# Patient Record
Sex: FEMALE | Race: WHITE
[De-identification: names, ages, dates, MRNs, and addresses within clinical notes are randomized per-mention and may not be internally consistent; named-entity substitution may affect disease eponyms.]

---

## 2017-02-04 ENCOUNTER — HOSPITAL ENCOUNTER (OUTPATIENT)
Age: 77
Discharge: HOME | End: 2017-02-04
Payer: MEDICARE

## 2017-02-04 DIAGNOSIS — Z79.899: ICD-10-CM

## 2017-02-04 DIAGNOSIS — I10: ICD-10-CM

## 2017-02-04 DIAGNOSIS — E78.2: Primary | ICD-10-CM

## 2017-02-16 ENCOUNTER — HOSPITAL ENCOUNTER (OUTPATIENT)
Age: 77
Discharge: HOME | End: 2017-02-16
Payer: MEDICARE

## 2017-02-16 DIAGNOSIS — Z12.31: Primary | ICD-10-CM

## 2017-02-16 DIAGNOSIS — Z80.3: ICD-10-CM

## 2017-02-16 DIAGNOSIS — Z78.0: Primary | ICD-10-CM

## 2017-05-28 ENCOUNTER — HOSPITAL ENCOUNTER (OUTPATIENT)
Dept: HOSPITAL 76 - DI | Age: 77
Discharge: HOME | End: 2017-05-28
Attending: CHIROPRACTOR
Payer: MEDICARE

## 2017-05-28 DIAGNOSIS — M47.897: ICD-10-CM

## 2017-05-28 DIAGNOSIS — M51.36: ICD-10-CM

## 2017-05-28 DIAGNOSIS — M47.892: ICD-10-CM

## 2017-05-28 DIAGNOSIS — M47.896: ICD-10-CM

## 2017-05-28 DIAGNOSIS — M50.321: Primary | ICD-10-CM

## 2017-05-28 PROCEDURE — 72100 X-RAY EXAM L-S SPINE 2/3 VWS: CPT

## 2017-05-28 PROCEDURE — 72040 X-RAY EXAM NECK SPINE 2-3 VW: CPT
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
## 2018-02-26 ENCOUNTER — HOSPITAL ENCOUNTER (OUTPATIENT)
Dept: HOSPITAL 76 - LAB | Age: 78
Discharge: HOME | End: 2018-02-26
Attending: NURSE PRACTITIONER
Payer: MEDICARE

## 2018-02-26 DIAGNOSIS — I10: Primary | ICD-10-CM

## 2018-02-26 DIAGNOSIS — Z79.899: ICD-10-CM

## 2018-02-26 DIAGNOSIS — E78.5: ICD-10-CM

## 2018-02-26 LAB
ALBUMIN DIAFP-MCNC: 3.9 G/DL (ref 3.2–5.5)
ALBUMIN/GLOB SERPL: 1 {RATIO} (ref 1–2.2)
ALP SERPL-CCNC: 60 IU/L (ref 42–121)
ALT SERPL W P-5'-P-CCNC: 23 IU/L (ref 10–60)
ANION GAP SERPL CALCULATED.4IONS-SCNC: 10 MMOL/L (ref 6–13)
AST SERPL W P-5'-P-CCNC: 26 IU/L (ref 10–42)
BASOPHILS NFR BLD AUTO: 0 10^3/UL (ref 0–0.1)
BASOPHILS NFR BLD AUTO: 1 %
BILIRUB BLD-MCNC: 1 MG/DL (ref 0.2–1)
BUN SERPL-MCNC: 21 MG/DL (ref 6–20)
CALCIUM UR-MCNC: 9.6 MG/DL (ref 8.5–10.3)
CHLORIDE SERPL-SCNC: 100 MMOL/L (ref 101–111)
CHOLEST SERPL-MCNC: 198 MG/DL
CO2 SERPL-SCNC: 26 MMOL/L (ref 21–32)
CREAT SERPLBLD-SCNC: 1 MG/DL (ref 0.4–1)
EOSINOPHIL # BLD AUTO: 0.2 10^3/UL (ref 0–0.7)
EOSINOPHIL NFR BLD AUTO: 3.5 %
ERYTHROCYTE [DISTWIDTH] IN BLOOD BY AUTOMATED COUNT: 13.6 % (ref 12–15)
GFRSERPLBLD MDRD-ARVRAT: 54 ML/MIN/{1.73_M2} (ref 89–?)
GLOBULIN SER-MCNC: 3.9 G/DL (ref 2.1–4.2)
GLUCOSE SERPL-MCNC: 94 MG/DL (ref 70–100)
HDLC SERPL-MCNC: 83 MG/DL
HDLC SERPL: 2.4 {RATIO} (ref ?–4.4)
HGB UR QL STRIP: 12.8 G/DL (ref 12–16)
LDLC SERPL CALC-MCNC: 106 MG/DL
LDLC/HDLC SERPL: 1.3 {RATIO} (ref ?–4.4)
LYMPHOCYTES # SPEC AUTO: 1.3 10^3/UL (ref 1.5–3.5)
LYMPHOCYTES NFR BLD AUTO: 28.7 %
MCH RBC QN AUTO: 32.8 PG (ref 27–31)
MCHC RBC AUTO-ENTMCNC: 34.1 G/DL (ref 32–36)
MCV RBC AUTO: 96.3 FL (ref 81–99)
MONOCYTES # BLD AUTO: 0.5 10^3/UL (ref 0–1)
MONOCYTES NFR BLD AUTO: 11.4 %
NEUTROPHILS # BLD AUTO: 2.5 10^3/UL (ref 1.5–6.6)
NEUTROPHILS # SNV AUTO: 4.6 X10^3/UL (ref 4.8–10.8)
NEUTROPHILS NFR BLD AUTO: 55.4 %
PDW BLD AUTO: 7.6 FL (ref 7.9–10.8)
PLATELET # BLD: 326 10^3/UL (ref 130–450)
PROT SPEC-MCNC: 7.8 G/DL (ref 6.7–8.2)
RBC MAR: 3.9 10^6/UL (ref 4.2–5.4)
SODIUM SERPLBLD-SCNC: 136 MMOL/L (ref 135–145)
VLDLC SERPL-SCNC: 9 MG/DL

## 2018-02-26 PROCEDURE — 80053 COMPREHEN METABOLIC PANEL: CPT

## 2018-02-26 PROCEDURE — 80061 LIPID PANEL: CPT

## 2018-02-26 PROCEDURE — 36415 COLL VENOUS BLD VENIPUNCTURE: CPT

## 2018-02-26 PROCEDURE — 85025 COMPLETE CBC W/AUTO DIFF WBC: CPT

## 2018-02-26 PROCEDURE — 83721 ASSAY OF BLOOD LIPOPROTEIN: CPT

## 2018-02-26 PROCEDURE — 84443 ASSAY THYROID STIM HORMONE: CPT

## 2019-02-22 ENCOUNTER — HOSPITAL ENCOUNTER (OUTPATIENT)
Dept: HOSPITAL 76 - LAB.F | Age: 79
Discharge: HOME | End: 2019-02-22
Attending: NURSE PRACTITIONER
Payer: MEDICARE

## 2019-02-22 DIAGNOSIS — I10: Primary | ICD-10-CM

## 2019-02-22 DIAGNOSIS — Z79.899: ICD-10-CM

## 2019-02-22 DIAGNOSIS — E78.5: ICD-10-CM

## 2019-02-22 LAB
ALBUMIN DIAFP-MCNC: 3.9 G/DL (ref 3.2–5.5)
ALBUMIN/GLOB SERPL: 1.1 {RATIO} (ref 1–2.2)
ALP SERPL-CCNC: 65 IU/L (ref 42–121)
ALT SERPL W P-5'-P-CCNC: 23 IU/L (ref 10–60)
ANION GAP SERPL CALCULATED.4IONS-SCNC: 9 MMOL/L (ref 6–13)
AST SERPL W P-5'-P-CCNC: 32 IU/L (ref 10–42)
BASOPHILS NFR BLD AUTO: 0.1 10^3/UL (ref 0–0.1)
BASOPHILS NFR BLD AUTO: 3.2 %
BILIRUB BLD-MCNC: 1 MG/DL (ref 0.2–1)
BUN SERPL-MCNC: 23 MG/DL (ref 6–20)
CALCIUM UR-MCNC: 9.3 MG/DL (ref 8.5–10.3)
CHLORIDE SERPL-SCNC: 100 MMOL/L (ref 101–111)
CHOLEST SERPL-MCNC: 187 MG/DL
CO2 SERPL-SCNC: 29 MMOL/L (ref 21–32)
CREAT SERPLBLD-SCNC: 0.9 MG/DL (ref 0.4–1)
EOSINOPHIL # BLD AUTO: 0.1 10^3/UL (ref 0–0.7)
EOSINOPHIL NFR BLD AUTO: 2.6 %
ERYTHROCYTE [DISTWIDTH] IN BLOOD BY AUTOMATED COUNT: 13.6 % (ref 12–15)
GFRSERPLBLD MDRD-ARVRAT: 61 ML/MIN/{1.73_M2} (ref 89–?)
GLOBULIN SER-MCNC: 3.4 G/DL (ref 2.1–4.2)
GLUCOSE SERPL-MCNC: 94 MG/DL (ref 70–100)
HDLC SERPL-MCNC: 76 MG/DL
HDLC SERPL: 2.5 {RATIO} (ref ?–4.4)
HGB UR QL STRIP: 13 G/DL (ref 12–16)
LDLC SERPL CALC-MCNC: 101 MG/DL
LDLC/HDLC SERPL: 1.3 {RATIO} (ref ?–4.4)
LYMPHOCYTES # SPEC AUTO: 1 10^3/UL (ref 1.5–3.5)
LYMPHOCYTES NFR BLD AUTO: 23.8 %
MCH RBC QN AUTO: 32 PG (ref 27–31)
MCHC RBC AUTO-ENTMCNC: 32.5 G/DL (ref 32–36)
MCV RBC AUTO: 98.4 FL (ref 81–99)
MONOCYTES # BLD AUTO: 0.4 10^3/UL (ref 0–1)
MONOCYTES NFR BLD AUTO: 8.7 %
NEUTROPHILS # BLD AUTO: 2.6 10^3/UL (ref 1.5–6.6)
NEUTROPHILS # SNV AUTO: 4.2 X10^3/UL (ref 4.8–10.8)
NEUTROPHILS NFR BLD AUTO: 61.7 %
PDW BLD AUTO: 8.2 FL (ref 7.9–10.8)
PLATELET # BLD: 275 10^3/UL (ref 130–450)
PROT SPEC-MCNC: 7.3 G/DL (ref 6.7–8.2)
RBC MAR: 4.07 10^6/UL (ref 4.2–5.4)
SODIUM SERPLBLD-SCNC: 138 MMOL/L (ref 135–145)
VLDLC SERPL-SCNC: 10 MG/DL

## 2019-02-22 PROCEDURE — 80061 LIPID PANEL: CPT

## 2019-02-22 PROCEDURE — 83721 ASSAY OF BLOOD LIPOPROTEIN: CPT

## 2019-02-22 PROCEDURE — 80053 COMPREHEN METABOLIC PANEL: CPT

## 2019-02-22 PROCEDURE — 85025 COMPLETE CBC W/AUTO DIFF WBC: CPT

## 2019-02-22 PROCEDURE — 36415 COLL VENOUS BLD VENIPUNCTURE: CPT

## 2019-08-02 ENCOUNTER — HOSPITAL ENCOUNTER (OUTPATIENT)
Dept: HOSPITAL 76 - DI | Age: 79
Discharge: HOME | End: 2019-08-02
Attending: FAMILY MEDICINE
Payer: MEDICARE

## 2019-08-02 DIAGNOSIS — Z80.3: ICD-10-CM

## 2019-08-02 DIAGNOSIS — Z12.31: Primary | ICD-10-CM

## 2019-08-02 PROCEDURE — 77067 SCR MAMMO BI INCL CAD: CPT

## 2019-08-02 PROCEDURE — 77063 BREAST TOMOSYNTHESIS BI: CPT

## 2019-08-05 NOTE — MAMMOGRAPHY REPORT
Reason:  SCREENING FOR MALIGNANT NEOPLASM OF BREAST

Procedure Date:  08/02/2019   

Accession Number:  [id number] / [id number]                    

Procedure:  CATHIE - Screening Mammo w/Dusty CPT Code:  

 

FULL RESULT:

 

 

EXAM: Screening Mammo w/Dusty

 

DATE: 8/2/2019 5:02 PM

 

CLINICAL HISTORY:  Screening encounter. Family history of breast cancer 

in the mother at the age of 70.

 

TECHNIQUE: (B) - Bilateral  CC and MLO views were obtained.

 

COMPARISON: 2/16/2017 through 11/26/2013

 

PARENCHYMAL PATTERN: (A) - The breast(s) demonstrate(s) scattered 

fibroglandular densities.

 

FINDINGS:

A few typically benign appearing intramammary lymph nodes are again seen. 

There are no suspicious masses, calcifications, or areas of distortion.

 

IMPRESSION: Benign findings. BI-RADS category 2.

 

RECOMMENDATION: (ANNUAL)  - Recommend routine annual screening 

mammography.

 

BI-RADS CATEGORY: (2) - Benign Findings.

 

STANDARD QUALIFYING STATEMENTS:

1.  This examination was not reviewed with the aid of Computer-Aided 

Detection (CAD).

2.  A negative or benign  imaging report should not preclude biopsy if 

clinically suspicious findings are present.

3.  Dense breasts may obscure an underlying neoplasm.

4. This examination was reviewed with the aid of 3D breast imaging 

(tomosynthesis).

## 2019-08-14 ENCOUNTER — HOSPITAL ENCOUNTER (EMERGENCY)
Dept: HOSPITAL 76 - ED | Age: 79
Discharge: HOME | End: 2019-08-14
Payer: MEDICARE

## 2019-08-14 ENCOUNTER — HOSPITAL ENCOUNTER (OUTPATIENT)
Dept: HOSPITAL 76 - EMS | Age: 79
Discharge: ANOTHER HOSPITAL | End: 2019-08-14
Attending: SURGERY
Payer: MEDICARE

## 2019-08-14 VITALS — DIASTOLIC BLOOD PRESSURE: 117 MMHG | SYSTOLIC BLOOD PRESSURE: 166 MMHG
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
## 2019-08-14 DIAGNOSIS — S00.81XA: Primary | ICD-10-CM

## 2019-08-14 DIAGNOSIS — I10: ICD-10-CM
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
## 2019-08-14 DIAGNOSIS — S09.93XA: Primary | ICD-10-CM

## 2019-08-14 DIAGNOSIS — S01.511A: ICD-10-CM

## 2019-08-14 PROCEDURE — 90471 IMMUNIZATION ADMIN: CPT

## 2019-08-14 PROCEDURE — 99284 EMERGENCY DEPT VISIT MOD MDM: CPT

## 2019-08-14 PROCEDURE — 70450 CT HEAD/BRAIN W/O DYE: CPT

## 2019-08-14 PROCEDURE — 90715 TDAP VACCINE 7 YRS/> IM: CPT

## 2019-08-14 PROCEDURE — 70486 CT MAXILLOFACIAL W/O DYE: CPT

## 2019-08-14 PROCEDURE — 72125 CT NECK SPINE W/O DYE: CPT

## 2019-08-14 NOTE — ED PHYSICIAN DOCUMENTATION
History of Present Illness





- Stated complaint


Stated Complaint: GLF





- Chief complaint


Chief Complaint: Trauma Hd/Nk





- History obtained from


History obtained from: Patient, EMS





- History of Present Illness


Timing: Today


Pain level max: 7


Pain level now: 5





- Additonal information


Additional information: 





79-year-old female was walking her dog today when she tripped fell, landing on 

the left side of her face.  No loss of consciousness.  No vomiting.  Placed in a

cervical collar by EMS.  Unknown last tetanus.  Worse with movement and 

palpation.  No numbness or tingling.





Review of Systems


Constitutional: denies: Fever, Chills


Respiratory: denies: Cough


GI: denies: Nausea, Vomiting


Skin: denies: Rash


Musculoskeletal: denies: Back pain


Neurologic: denies: Focal weakness, Numbness





PD PAST MEDICAL HISTORY





- Past Medical History


Cardiovascular: Hypertension


Respiratory: None


Endocrine/Autoimmune: None


GI: GERD


: None


HEENT: None


Psych: None


Musculoskeletal: Osteoarthritis


Derm: None





- Past Surgical History


Past Surgical History: Yes


General: Colonoscopy


Ortho: Knee replacement, Arthroscopic surgery


/GYN: Hysterectomy


HEENT: Cataracts





- Present Medications


Home Medications: 


                                Ambulatory Orders











 Medication  Instructions  Recorded  Confirmed


 


Amlodipine Besylate [Norvasc] 2.5 tab PO DAILY 12/03/14 04/06/16


 


Triamterene/Hydrochlorothiazid 0.5 tab PO DAILY 04/05/16 04/06/16





[Triamterene-Hctz 37.5-25 mg Cp]   


 


Glucosam/Chondr-MSM#6/Manganes 1 tab PO 04/06/16 





[Glucosamine-Chondroitin Sftgl]   


 


Cephalexin [Keflex] 500 mg PO Q6H #40 capsule 08/14/19 














- Allergies


Allergies/Adverse Reactions: 


                                    Allergies











Allergy/AdvReac Type Severity Reaction Status Date / Time


 


meperidine HCl * Allergy  Nausea Verified 08/14/19 10:13





[From Demerol]     


 


Sulfa (Sulfonamide Allergy  Unknown Verified 08/14/19 10:13





Antibiotics)     


 


Narcot AdvReac Mild Nausea Uncoded 08/14/19 10:13














- Social History


Does the pt smoke?: No


Smoking Status: Never smoker


Does the pt drink ETOH?: Yes





PD ED PE NORMAL





- Vitals


Vital signs reviewed: Yes





- General


General: Alert and oriented X 3, No acute distress, Well developed/nourished





- HEENT


HEENT: PERRL, EOMI, Ears normal, Moist mucous membranes, Pharynx benign, 

Dentition benign





- Neck


Neck: Supple, no meningeal sign, No bony TTP





- Cardiac


Cardiac: RRR, Strong equal pulses





- Respiratory


Respiratory: No respiratory distress, Clear bilaterally





- Abdomen


Abdomen: Soft, Non tender, Non distended





- Back


Back: No spinal TTP





- Derm


Derm: Warm and dry





- Extremities


Extremities: No tenderness to palpate, Normal ROM s pain





- Neuro


Neuro: Alert and oriented X 3, CNs 2-12 intact, No motor deficit, No sensory 

deficit





- Psych


Psych: Normal mood, Normal affect





- Free text exam


Free text exam: 





diffuse L sided facial abrasions and upper lip abrasions.  ecchymosis to L 

orbit.





Results





- Vitals


Vitals: 


                               Vital Signs - 24 hr











  08/14/19 08/14/19 08/14/19





  10:10 12:13 13:36


 


Temperature 35.7 C L  


 


Heart Rate 61 56 L 113 H


 


Respiratory 14 18 16





Rate   


 


Blood Pressure 178/89 H 169/84 H 166/117 H


 


O2 Saturation 96 98 98








                                     Oxygen











O2 Source                      Room air

















- Rads (name of study)


  ** head Ct


Radiology: Prelim report reviewed, EMP read contemporaneously, See rad report 

(1. No intracranial hemorrhage, mass-effect, CT evidence of acute infarct, or 

other acute intracranial abnormality. 2. Focal soft tissue swelling/subcutaneous

hematoma in the left frontal region measuring approximately 6.0 x 1.3 cm. 3. 

Please see separately reported maxillofacial CT and cervical spine CT for 

additional evaluation. )





  ** maxillofacial CT


Radiology: Prelim report reviewed, EMP read contemporaneously, See rad report 

(No acute fracture. Soft tissue edema/hematoma in the left periorbital region 

and adjacent anterior scalp. )





  ** cervical spine CT


Radiology: Prelim report reviewed, EMP read contemporaneously, See rad report ( 

No fracture or other acute osseous abnormality of the cervical spine. 2. Mild to

moderate degenerative disk changes of the cervical spine, most advanced at the 

C6-C7 level. There is mild central canal narrowing at the C6-C7 level. There are

moderate to severe bilateral degenerative facet changes. )





PD MEDICAL DECISION MAKING





- ED course


Complexity details: reviewed results, considered differential, d/w patient


ED course: 





No acute findings on CT scans.  Diffuse facial abrasions.  There is one small 

laceration above the lip.  Does not involve the vermilion border.  Gravel was 

cleansed from the face.  Wounds were irrigated.  A small amount of Dermabond was

applied where it could be.  She will likely have extensive scarring and this was

shared with the patient.  Will place on Keflex due to the large surface 

abrasions.  No evidence of fracture.  Warnings of infection and instructions on 

wound care given at bedside. Also counseled on how to minimize scarring.  

Patient counseled regarding signs and symptoms for which I believe and urgent 

re-evaluation would be necessary. Patient with good understanding of and 

agreement to plan and is comfortable going home at this time





This document was made in part using voice recognition software. While efforts 

are made to proofread this document, sound alike and grammatical errors may 

occur.





Departure





- Departure


Disposition: 01 Home, Self Care


Clinical Impression: 


Facial abrasion


Qualifiers:


 Encounter type: initial encounter Qualified Code(s): S00.81XA - Abrasion of 

other part of head, initial encounter





Facial laceration


Qualifiers:


 Encounter type: initial encounter Qualified Code(s): S01.81XA - Laceration 

without foreign body of other part of head, initial encounter





Condition: Good


Instructions:  ED Abrasion, ED Laceration Facial Skin Glue


Follow-Up: 


Sina North MD [Primary Care Provider] - Within 1 week


Prescriptions: 


Cephalexin [Keflex] 500 mg PO Q6H #40 capsule


Comments: 


Return if you worsen. Keep the wounds clean. Take all antibiotics until gone. 

The glue will dissolve in a few days. 


Discharge Date/Time: 08/14/19 13:37

## 2019-08-14 NOTE — CT REPORT
Reason:  fall, head pain

Procedure Date:  08/14/2019   

Accession Number:  [id number] / [id number]                    

Procedure:  CT  - HEAD WO CPT Code:  

 

FULL RESULT:

 

 

EXAM:

CT HEAD

 

EXAM DATE: 8/14/2019 11:44 AM.

 

CLINICAL HISTORY: Fall, head pain.

 

COMPARISON: CT FACIAL BONES W/O 08/14/2019 11:30 AM

CT CERVICAL SPINE W/O 08/14/2019 11:30 AM.

 

TECHNIQUE: Multiaxial CT images were obtained from the foramen magnum to 

the vertex. Reformats: Sagittal and coronal. IV contrast: None.

 

In accordance with CT protocol optimization, one or more of the following 

dose reduction techniques were utilized for this exam: automated exposure 

control, adjustment of mA and/or KV based on patient size, or use of 

iterative reconstructive technique.

 

FINDINGS:

Parenchyma: No intraparenchymal hemorrhage. No evidence of mass, midline 

shift, or CT findings of infarction. Gray-white differentiation is 

distinct.

 

Extraaxial Spaces: Normal for age. No subdural or epidural collections 

identified.

 

Ventricles: Normal in size and position.

 

Sinuses and Orbits: Imaged paranasal sinuses, orbits, and mastoids show 

no significant abnormality.

 

Bones: No evidence of fracture or calvarial defect. Please use separately 

reported maxillofacial CT for additional evaluation.

 

Other: There is a subcutaneous hematoma/area of soft tissue swelling 

overlying the left frontal region. This measures approximately 6.0 x 1.3 

cm (series 2 image 18).

IMPRESSION:

1. No intracranial hemorrhage, mass-effect, CT evidence of acute infarct, 

or other acute intracranial abnormality.

2. Focal soft tissue swelling/subcutaneous hematoma in the left frontal 

region measuring approximately 6.0 x 1.3 cm.

3. Please see separately reported maxillofacial CT and cervical spine CT 

for additional evaluation.

 

[facility]

## 2019-08-14 NOTE — CT REPORT
Reason:  fall, L facial pain

Procedure Date:  08/14/2019   

Accession Number:  [id number] / [id number]                    

Procedure:  CT  - MAXILLOFACIAL WO CPT Code:  

 

FULL RESULT:

 

 

EXAM:

CT MAXILLOFACIAL WITHOUT CONTRAST

 

EXAM DATE: 8/14/2019 11:44 AM.

 

CLINICAL HISTORY: Pain post injury after a fall.

 

COMPARISONS: None.

 

TECHNIQUE: Thin-section axial images were acquired of the face without 

contrast. Post-processing: Coronal and sagittal reformats. Other: None.

 

In accordance with CT protocol optimization, one or more of the following 

dose reduction techniques were utilized for this exam: automated exposure 

control, adjustment of mA and/or KV based on patient size, or use of 

iterative reconstructive technique.

 

FINDINGS:

Left anterior scalp/forehead swelling and edema with probable 

subcutaneous hematoma extending inferiorly into the left preseptal 

periorbital region.

 

No underlying skull fracture.

 

No acute facial bone fracture. No fracture or dislocation of the mandible.

 

Multiple periapical lucencies consistent with dental decay, most evident 

in the left maxilla and right mandible..

 

No acute sinus or mastoid fluid opacity.

 

Prominent chronic degenerative changes in the upper cervical spine.

IMPRESSION: No acute fracture. Soft tissue edema/hematoma in the left 

periorbital region and adjacent anterior scalp.

 

[facility]

## 2019-08-14 NOTE — CT REPORT
Reason:  fall, neck pain

Procedure Date:  08/14/2019   

Accession Number:  [id number] / [id number]                    

Procedure:  CT  - CERVICAL SPINE WO CPT Code:  

 

FULL RESULT:

 

 

EXAM:

CT CERVICAL SPINE WITHOUT CONTRAST

 

DATE: 8/14/2019 11:44 AM.

 

HISTORY: Fall, neck pain.

 

COMPARISONS: HEAD W/O 08/14/2019 11:30 AM.

 

TECHNIQUE: Thin-section axial images were acquired of the cervical spine 

without contrast. Post-processing: Coronal and sagittal reformats. Other: 

None.

 

In accordance with CT protocol optimization, one or more of the following 

dose reduction techniques were utilized for this exam: automated exposure 

control, adjustment of mA and/or KV based on patient size, or use of 

iterative reconstructive technique.

 

FINDINGS:

Alignment: No scoliosis or spondylolisthesis.

 

Bones: No fracture or bone lesion.

 

Interspace Levels/Facets:

There are mild degenerative changes at the atlantodental joint. There are 

mild to moderate degenerative disk changes throughout the cervical spine, 

most advanced at the C6-C7 level. There are moderate to severe 

degenerative facet changes throughout the cervical spine. There is fusion 

at the left C3-C4 facet. There is mild central canal narrowing at the 

C6-C7 level.

 

Musculature: Normal. No fatty atrophy.

 

Other: The paravertebral and prevertebral soft tissues are unremarkable. 

The lung apices are clear.

IMPRESSION:

1. No fracture or other acute osseous abnormality of the cervical spine.

2. Mild to moderate degenerative disk changes of the cervical spine, most 

advanced at the C6-C7 level. There is mild central canal narrowing at the 

C6-C7 level. There are moderate to severe bilateral degenerative facet 

changes.

 

[facility]

## 2021-01-14 DIAGNOSIS — Z23 NEED FOR VACCINATION: ICD-10-CM

## 2021-05-13 ENCOUNTER — HOSPITAL ENCOUNTER (EMERGENCY)
Dept: HOSPITAL 76 - ED | Age: 81
Discharge: HOME | End: 2021-05-13
Payer: MEDICARE

## 2021-05-13 VITALS — SYSTOLIC BLOOD PRESSURE: 155 MMHG | DIASTOLIC BLOOD PRESSURE: 63 MMHG

## 2021-05-13 DIAGNOSIS — S81.811A: Primary | ICD-10-CM
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
## 2021-05-13 DIAGNOSIS — I10: ICD-10-CM

## 2021-05-13 DIAGNOSIS — S86.821A: ICD-10-CM
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
## 2021-05-13 PROCEDURE — 99282 EMERGENCY DEPT VISIT SF MDM: CPT

## 2021-05-13 PROCEDURE — 12034 INTMD RPR S/TR/EXT 7.6-12.5: CPT

## 2021-05-13 PROCEDURE — 99283 EMERGENCY DEPT VISIT LOW MDM: CPT
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
## 2021-07-06 ENCOUNTER — HOSPITAL ENCOUNTER (OUTPATIENT)
Dept: HOSPITAL 76 - LAB.WCP | Age: 81
Discharge: HOME | End: 2021-07-06
Attending: FAMILY MEDICINE
Payer: MEDICARE

## 2021-07-06 DIAGNOSIS — S81.801A: Primary | ICD-10-CM

## 2021-07-06 PROCEDURE — 87077 CULTURE AEROBIC IDENTIFY: CPT

## 2021-07-06 PROCEDURE — 87070 CULTURE OTHR SPECIMN AEROBIC: CPT

## 2021-07-06 PROCEDURE — 87205 SMEAR GRAM STAIN: CPT

## 2021-07-06 PROCEDURE — 87181 SC STD AGAR DILUTION PER AGT: CPT

## 2021-07-15 ENCOUNTER — HOSPITAL ENCOUNTER (OUTPATIENT)
Dept: HOSPITAL 76 - DI | Age: 81
Discharge: HOME | End: 2021-07-15
Attending: FAMILY MEDICINE
Payer: MEDICARE

## 2021-07-15 DIAGNOSIS — S81.801D: Primary | ICD-10-CM

## 2021-07-15 NOTE — XRAY REPORT
PROCEDURE:  Tib/Fib RT

 

INDICATIONS:  UNSP OPEN WOUND RT L LEG

 

TECHNIQUE:  2 views of the tibia and fibula were acquired.  

 

COMPARISON:  X-ray ankle the 2013

 

FINDINGS:  

 

Bones:  No fractures or dislocations.  No suspicious bony lesions.  Knee arthroplasty is present and 
appears unremarkable.

 

Soft tissues:  No suspicious soft tissue calcifications or masses.  

 

IMPRESSION:  

Osseous structures are unremarkable.

 

Reviewed by: Gala Rolon MD on 7/15/2021 5:11 PM PDT

Approved by: Gala Rolon MD on 7/15/2021 5:11 PM PDT

 

 

Station ID:  [id number]

## 2021-09-13 ENCOUNTER — HOSPITAL ENCOUNTER (OUTPATIENT)
Dept: HOSPITAL 76 - LAB | Age: 81
Discharge: HOME | End: 2021-09-13
Attending: NURSE PRACTITIONER
Payer: MEDICARE

## 2021-09-13 ENCOUNTER — HOSPITAL ENCOUNTER (OUTPATIENT)
Dept: HOSPITAL 76 - DI | Age: 81
Discharge: HOME | End: 2021-09-13
Attending: NURSE PRACTITIONER
Payer: MEDICARE

## 2021-09-13 DIAGNOSIS — R42: ICD-10-CM

## 2021-09-13 DIAGNOSIS — Z13.220: ICD-10-CM

## 2021-09-13 DIAGNOSIS — R09.89: Primary | ICD-10-CM

## 2021-09-13 DIAGNOSIS — R42: Primary | ICD-10-CM

## 2021-09-13 LAB
ALBUMIN DIAFP-MCNC: 4.1 G/DL (ref 3.2–5.5)
ALBUMIN/GLOB SERPL: 1.2 {RATIO} (ref 1–2.2)
ALP SERPL-CCNC: 72 IU/L (ref 42–121)
ALT SERPL W P-5'-P-CCNC: 19 IU/L (ref 10–60)
ANION GAP SERPL CALCULATED.4IONS-SCNC: 10 MMOL/L (ref 6–13)
AST SERPL W P-5'-P-CCNC: 26 IU/L (ref 10–42)
BASOPHILS NFR BLD AUTO: 0.1 10^3/UL (ref 0–0.1)
BASOPHILS NFR BLD AUTO: 1.1 %
BILIRUB BLD-MCNC: 0.7 MG/DL (ref 0.2–1)
BUN SERPL-MCNC: 24 MG/DL (ref 6–20)
CALCIUM UR-MCNC: 10.1 MG/DL (ref 8.5–10.3)
CHLORIDE SERPL-SCNC: 101 MMOL/L (ref 101–111)
CHOLEST SERPL-MCNC: 205 MG/DL
CLARITY UR REFRACT.AUTO: CLEAR
CO2 SERPL-SCNC: 28 MMOL/L (ref 21–32)
CREAT SERPLBLD-SCNC: 1.1 MG/DL (ref 0.4–1)
EOSINOPHIL # BLD AUTO: 0.3 10^3/UL (ref 0–0.7)
EOSINOPHIL NFR BLD AUTO: 3.9 %
ERYTHROCYTE [DISTWIDTH] IN BLOOD BY AUTOMATED COUNT: 12.9 % (ref 12–15)
GFRSERPLBLD MDRD-ARVRAT: 48 ML/MIN/{1.73_M2} (ref 89–?)
GLOBULIN SER-MCNC: 3.5 G/DL (ref 2.1–4.2)
GLUCOSE SERPL-MCNC: 98 MG/DL (ref 70–100)
GLUCOSE UR QL STRIP.AUTO: NEGATIVE MG/DL
HCT VFR BLD AUTO: 41.9 % (ref 37–47)
HDLC SERPL-MCNC: 75 MG/DL
HDLC SERPL: 2.7 {RATIO} (ref ?–4.4)
HGB UR QL STRIP: 13.8 G/DL (ref 12–16)
KETONES UR QL STRIP.AUTO: (no result) MG/DL
LDLC SERPL CALC-MCNC: 89 MG/DL
LDLC/HDLC SERPL: 1.2 {RATIO} (ref ?–4.4)
LYMPHOCYTES # SPEC AUTO: 2.2 10^3/UL (ref 1.5–3.5)
LYMPHOCYTES NFR BLD AUTO: 34.5 %
MCH RBC QN AUTO: 31.9 PG (ref 27–31)
MCHC RBC AUTO-ENTMCNC: 32.9 G/DL (ref 32–36)
MCV RBC AUTO: 96.8 FL (ref 81–99)
MONOCYTES # BLD AUTO: 0.6 10^3/UL (ref 0–1)
MONOCYTES NFR BLD AUTO: 9.8 %
NEUTROPHILS # BLD AUTO: 3.2 10^3/UL (ref 1.5–6.6)
NEUTROPHILS # SNV AUTO: 6.3 X10^3/UL (ref 4.8–10.8)
NEUTROPHILS NFR BLD AUTO: 50.4 %
NITRITE UR QL STRIP.AUTO: NEGATIVE
NRBC # BLD AUTO: 0 /100WBC
NRBC # BLD AUTO: 0 X10^3/UL
PDW BLD AUTO: 9.1 FL (ref 7.9–10.8)
PH UR STRIP.AUTO: 6 PH (ref 5–7.5)
PLATELET # BLD: 305 10^3/UL (ref 130–450)
POTASSIUM SERPL-SCNC: 3.8 MMOL/L (ref 3.5–5)
PROT SPEC-MCNC: 7.6 G/DL (ref 6.7–8.2)
PROT UR STRIP.AUTO-MCNC: NEGATIVE MG/DL
RBC # UR STRIP.AUTO: NEGATIVE /UL
RBC MAR: 4.33 10^6/UL (ref 4.2–5.4)
SODIUM SERPLBLD-SCNC: 139 MMOL/L (ref 135–145)
SP GR UR STRIP.AUTO: 1.02 (ref 1–1.03)
SQUAMOUS URNS QL MICRO: (no result)
TRIGL P FAST SERPL-MCNC: 203 MG/DL
UROBILINOGEN UR QL STRIP.AUTO: (no result) E.U./DL
UROBILINOGEN UR STRIP.AUTO-MCNC: NEGATIVE MG/DL
VLDLC SERPL-SCNC: 41 MG/DL
WBC # UR MANUAL: (no result) /HPF (ref 0–5)

## 2021-09-13 PROCEDURE — 80053 COMPREHEN METABOLIC PANEL: CPT

## 2021-09-13 PROCEDURE — 81001 URINALYSIS AUTO W/SCOPE: CPT

## 2021-09-13 PROCEDURE — 85025 COMPLETE CBC W/AUTO DIFF WBC: CPT

## 2021-09-13 PROCEDURE — 36415 COLL VENOUS BLD VENIPUNCTURE: CPT

## 2021-09-13 PROCEDURE — 84443 ASSAY THYROID STIM HORMONE: CPT

## 2021-09-13 PROCEDURE — 83721 ASSAY OF BLOOD LIPOPROTEIN: CPT

## 2021-09-13 PROCEDURE — 80061 LIPID PANEL: CPT

## 2021-09-13 PROCEDURE — 87086 URINE CULTURE/COLONY COUNT: CPT

## 2021-09-13 PROCEDURE — 93880 EXTRACRANIAL BILAT STUDY: CPT

## 2021-09-13 NOTE — ULTRASOUND REPORT
PROCEDURE:  Carotid Doppler Complete

 

INDICATIONS:  CAROTID BRUIT

 

TECHNIQUE:  

Color and pulse Doppler interrogation was performed of both carotid systems, with image documentation
 and velocity measurements.  

 

COMPARISON:  None.

 

FINDINGS:     

 

Right side:  

Brachial blood pressure:  160/70 mm Hg.  

Common carotid artery peak systolic velocity:  47 cm/sec.  

Internal carotid artery peak systolic velocity:  54 cm/sec.  

Internal carotid artery end diastolic velocity:  14 cm/sec.  

External carotid artery peak systolic velocity:  70 cm/sec.  

ICA/CCA peak systolic ratio:  1.2 .  

Gray scale imaging description:  Mild plaque at the bifurcation. 

Percent internal carotid artery stenosis:  Less than 50% .  

Vertebral artery:  Flow direction is antegrade.  

 

Left side:  

Brachial blood pressure:  164/84 mm Hg.

Common carotid artery peak systolic velocity:  67 cm/sec.  

Internal carotid artery peak systolic velocity:  79 cm/sec.  

Internal carotid artery end diastolic velocity:  22 cm/sec.  

External carotid artery peak systolic velocity:  55 cm/sec.  

ICA/CCA peak systolic ratio:  1.2 .  

Gray scale imaging description:  Mild plaque at the bifurcation 

Percent internal carotid artery stenosis:  Less than 50% .

Vertebral artery:  Flow direction is antegrade.  

 

IMPRESSION:  

Less than 50% stenosis of the internal carotid arteries bilaterally.

 

The estimate of stenosis included in the report of the imaging study was calculated using the NASCET 
method

 

Reviewed by: Gala Rolon MD on 9/13/2021 4:34 PM PDT

Approved by: Gala Rolon MD on 9/13/2021 4:34 PM PDT

 

 

Station ID:  [id number]

## 2021-10-15 ENCOUNTER — HOSPITAL ENCOUNTER (OUTPATIENT)
Dept: HOSPITAL 76 - LAB | Age: 81
Discharge: HOME | End: 2021-10-15
Attending: NURSE PRACTITIONER
Payer: MEDICARE

## 2021-10-15 DIAGNOSIS — R10.32: ICD-10-CM

## 2021-10-15 DIAGNOSIS — K57.32: Primary | ICD-10-CM

## 2021-10-15 DIAGNOSIS — R93.3: ICD-10-CM

## 2021-10-15 LAB
ALBUMIN DIAFP-MCNC: 4.3 G/DL (ref 3.2–5.5)
ALBUMIN/GLOB SERPL: 1.1 {RATIO} (ref 1–2.2)
ALP SERPL-CCNC: 64 IU/L (ref 42–121)
ALT SERPL W P-5'-P-CCNC: 19 IU/L (ref 10–60)
ANION GAP SERPL CALCULATED.4IONS-SCNC: 10 MMOL/L (ref 6–13)
AST SERPL W P-5'-P-CCNC: 23 IU/L (ref 10–42)
BILIRUB BLD-MCNC: 0.7 MG/DL (ref 0.2–1)
BUN SERPL-MCNC: 25 MG/DL (ref 6–20)
CALCIUM UR-MCNC: 10.4 MG/DL (ref 8.5–10.3)
CHLORIDE SERPL-SCNC: 99 MMOL/L (ref 101–111)
CO2 SERPL-SCNC: 28 MMOL/L (ref 21–32)
CREAT SERPLBLD-SCNC: 0.9 MG/DL (ref 0.4–1)
GFRSERPLBLD MDRD-ARVRAT: 60 ML/MIN/{1.73_M2} (ref 89–?)
GLOBULIN SER-MCNC: 3.8 G/DL (ref 2.1–4.2)
GLUCOSE SERPL-MCNC: 98 MG/DL (ref 70–100)
POTASSIUM SERPL-SCNC: 4.3 MMOL/L (ref 3.5–5)
PROT SPEC-MCNC: 8.1 G/DL (ref 6.7–8.2)
SODIUM SERPLBLD-SCNC: 137 MMOL/L (ref 135–145)

## 2021-10-15 PROCEDURE — 74177 CT ABD & PELVIS W/CONTRAST: CPT

## 2021-10-15 PROCEDURE — 36415 COLL VENOUS BLD VENIPUNCTURE: CPT

## 2021-10-15 PROCEDURE — 80053 COMPREHEN METABOLIC PANEL: CPT

## 2021-10-15 NOTE — CT REPORT
PROCEDURE:  Abdomen/Pelvis W

 

INDICATIONS:  LEFT LOWER QUAD PAIN

 

CONTRAST:  IV CONTRAST: Optiray 320 ml: 100 PO CONTRAST: Optiray 320 ml50

 

TECHNIQUE:  

After the administration of oral and intravenous contrast, 5 mm thick sections acquired from the diap
hragms to the symphysis.  5 mm thick coronal and sagittal reformats were acquired.  For radiation dos
e reduction, the following was used:  automated exposure control, adjustment of mA and/or kV accordin
g to patient size.  

 

COMPARISON:  None.

 

FINDINGS:  

Image quality:  Excellent.  

 

ABDOMEN:  

Lung bases:  Lung bases are clear.  Heart size is normal.  

 

Solid organs: There is a cyst within the right hepatic lobe. Gallbladder appears within normal limits
 without calcified gallstones. Biliary system is non dilated.  The spleen is normal in size. Pancreas
 enhances normally without peripancreatic fat stranding or fluid collections.  No adrenal nodules.  K
idneys demonstrate no hydronephrosis. 

 

Peritoneum and bowel: Stomach and small bowel loops demonstrate normal wall thickness and caliber. No
 evidence of appendicitis. There is colonic diverticulosis with associated diverticular wall thickeni
ng and peridiverticular inflammatory fat stranding in the sigmoid colon consistent with acute diverti
culitis. There is associated segmental wall thickening in the sigmoid colon consistent with a colitis
. No diverticular abscess or macroscopic free air. No free fluid or air.  

 

Nodes and vessels:  No retroperitoneal or mesenteric adenopathy by size criteria.  Aorta and inferior
 vena cava are normal in size.  

 

Miscellaneous:  No ventral hernias.  

 

 

PELVIS:  

Genitourinary:  Bladder wall thickness is normal. The uterus is surgically absent. 

 

Miscellaneous:  No inguinal hernias or adenopathy.  

 

Bones:  No suspicious bony lesions.  No vertebral body compression fractures.  

 

IMPRESSION:  

 

1. Acute diverticulitis in the sigmoid colon without evidence of diverticular abscess or macroscopic 
free air.

 

2. Segmental wall thickening of the sigmoid colon likely reflects an associated colitis.

 

Findings reported to Yael Waldron PA-C on 10/15/2021 at 2:05 PM.

 

Reviewed by: Yusef Moran MD on 10/15/2021 2:11 PM PDT

Approved by: Yusef Moran MD on 10/15/2021 2:11 PM PDT

 

 

Station ID:  [id number]

## 2021-10-29 ENCOUNTER — HOSPITAL ENCOUNTER (OUTPATIENT)
Dept: HOSPITAL 76 - DI | Age: 81
Discharge: HOME | End: 2021-10-29
Attending: NURSE PRACTITIONER
Payer: MEDICARE

## 2021-10-29 DIAGNOSIS — K76.89: Primary | ICD-10-CM

## 2021-10-29 NOTE — ULTRASOUND REPORT
PROCEDURE: Abdomen Limited

 

INDICATIONS:  LIVER CYST

 

TECHNIQUE:  

Real-time focused scanning was performed of the abdomen, with image documentation.  

 

COMPARISON:  CT abdomen pelvis 10/15/2021

 

FINDINGS:  

 

Liver measures 14.6 cm. It is mildly increased in echogenicity. There is a focus of hypoechogenicity 
within the posterior liver measuring 3.0 x 2.8 x 3.0 cm. Gallbladder is distended without stones. Wal
l thickness is within normal limits measuring 2 mm. Common bile duct measures 2 mm. Visualized portio
ns of the pancreas and inferior vena cava are unremarkable.

 

IMPRESSION:  

Hepatic cyst.

 

Reviewed by: Gala Rolon MD on 10/29/2021 4:16 PM PDT

Approved by: Gala Rolon MD on 10/29/2021 4:16 PM PDT

 

 

Station ID:  [id number]

## 2022-01-25 ENCOUNTER — HOSPITAL ENCOUNTER (OUTPATIENT)
Dept: HOSPITAL 76 - LAB | Age: 82
Discharge: HOME | End: 2022-01-25
Attending: PHYSICIAN ASSISTANT
Payer: MEDICARE

## 2022-01-25 DIAGNOSIS — K76.89: ICD-10-CM

## 2022-01-25 DIAGNOSIS — I70.209: ICD-10-CM

## 2022-01-25 DIAGNOSIS — K57.32: Primary | ICD-10-CM

## 2022-01-25 LAB
CREAT SERPLBLD-SCNC: 0.9 MG/DL (ref 0.4–1)
GFRSERPLBLD MDRD-ARVRAT: 60 ML/MIN/{1.73_M2} (ref 89–?)

## 2022-01-25 PROCEDURE — 36415 COLL VENOUS BLD VENIPUNCTURE: CPT

## 2022-01-25 PROCEDURE — 74177 CT ABD & PELVIS W/CONTRAST: CPT

## 2022-01-25 PROCEDURE — 82565 ASSAY OF CREATININE: CPT

## 2022-01-25 NOTE — CT REPORT
PROCEDURE:  Abdomen/Pelvis W

 

INDICATIONS:  DIVERTICULITIS OF COLON

 

CONTRAST:  IV CONTRAST: Isovue 300 ml: 100 PO CONTRAST: Isovue 300 ml50

 

TECHNIQUE:  

After the administration of weight appropriate dose of intravenous contrast, 5 mm thick sections acqu
ired from the diaphragms to the symphysis.  5 mm thick coronal and sagittal reformats were acquired. 
 For radiation dose reduction, the following was used:  automated exposure control, adjustment of mA 
and/or kV according to patient size. Oral contrast was also administered.

 

COMPARISON:  10/15/2021

 

FINDINGS:  

Image quality:  Excellent.  

 

ABDOMEN:  

Lung bases: Minimal bibasilar atelectasis.  Heart size is normal.  

 

Solid organs:  Liver and spleen are normal in size and enhancement.  Stable hepatic hypodensities com
patible with cysts. Gallbladder is unremarkable.  Biliary system is non dilated.  Pancreas enhances n
ormally.  No adrenal nodules.  Kidneys demonstrate normal size and enhancement, without hydronephrosi
s.  

 

Peritoneum and bowel: Extensive colonic diverticula most pronounced in the sigmoid colon. Previously 
described acute sigmoid diverticulitis has resolved. Previously seen mild segmental wall thickening o
f the sigmoid colon has also decreased in conspicuity. No evidence for bowel obstruction. No other ar
eas to suggest acute inflammatory changes. No suspicious areas of abnormal wall thickening. No free f
luid or air.  

 

Nodes and vessels:  No retroperitoneal or mesenteric adenopathy by size criteria.  Aorta and inferior
 vena cava are normal in size.  Atherosclerotic calcifications are present.

 

Miscellaneous:  No ventral hernias.  Small fat-containing umbilical hernia without acute inflammation
.

 

 

PELVIS:  

Genitourinary:  Bladder wall thickness is normal.  

 

Miscellaneous:  No inguinal hernias or adenopathy.  

 

Bones:  No suspicious bony lesions.  No acute vertebral body compression fractures.  Moderate multile
good spondylosis of the imaged spine.

 

IMPRESSION:  

1. Interval resolution of previously described sigmoid diverticulitis. Interval decrease in prominenc
e of segmental wall thickening of the sigmoid colon. No suspicious asymmetric wall thickening identif
ied. However, consider routine screening colonoscopy to exclude possible underlying neoplastic proces
s. Otherwise, no acute abnormalities identified in the abdomen or pelvis.

2. Stable hepatic cysts.

3. Atherosclerosis.

 

Reviewed by: Carlos Sherman MD on 1/25/2022 4:21 PM PST

Approved by: Carlos Sherman MD on 1/25/2022 4:21 PM PST

 

 

Station ID:  [id number]

## 2022-03-25 ENCOUNTER — HOSPITAL ENCOUNTER (OUTPATIENT)
Dept: HOSPITAL 76 - DI.S | Age: 82
Discharge: HOME | End: 2022-03-25
Attending: CHIROPRACTOR
Payer: MEDICARE

## 2022-03-25 DIAGNOSIS — M51.36: ICD-10-CM

## 2022-03-25 DIAGNOSIS — M43.13: ICD-10-CM

## 2022-03-25 DIAGNOSIS — M47.816: ICD-10-CM

## 2022-03-25 DIAGNOSIS — M43.16: ICD-10-CM

## 2022-03-25 DIAGNOSIS — M47.812: Primary | ICD-10-CM
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
## 2023-01-01 ENCOUNTER — HOSPITAL ENCOUNTER (EMERGENCY)
Dept: HOSPITAL 76 - ED | Age: 83
Discharge: HOME | End: 2023-01-01
Payer: MEDICARE

## 2023-01-01 VITALS — SYSTOLIC BLOOD PRESSURE: 143 MMHG | DIASTOLIC BLOOD PRESSURE: 61 MMHG

## 2023-01-01 DIAGNOSIS — S63.92XA: Primary | ICD-10-CM

## 2023-01-01 DIAGNOSIS — S00.81XA: ICD-10-CM

## 2023-01-01 DIAGNOSIS — S00.12XA: ICD-10-CM
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
## 2023-01-01 DIAGNOSIS — S00.511A: ICD-10-CM

## 2023-01-01 DIAGNOSIS — S00.11XA: ICD-10-CM

## 2023-01-01 PROCEDURE — 99283 EMERGENCY DEPT VISIT LOW MDM: CPT

## 2023-01-01 PROCEDURE — 73130 X-RAY EXAM OF HAND: CPT

## 2023-01-01 NOTE — ED PHYSICIAN DOCUMENTATION
PD HPI UPPER EXT INJURY





- Stated complaint


Stated Complaint: LFT HAND INJURIED





- Chief complaint


Chief Complaint: Trauma Ext





- History obtained from


History obtained from: Patient





- History of Present Illness


Location: Left, Hand, Other (She also struck her face with abrasions and upper 

lip laceration.  She states she tripped and fell forward.)


Type of injury: Fall


Timing - onset: Yesterday


Worsened by: Moving, Palpating


Associated symptoms: Swelling.  No: Weakness, Numbness


Recently seen: Clinic (She did go to the walk-in clinic after it happened.  

Evaluation did not suggest concussion.  She has bruising and abrasions on the 

face.  Main injury of the left hand but they did not have x-ray available and 

referred to the ER today.)





Review of Systems


Eyes: reports: Other (denies diplopia).  denies: Decreased vision, Photophobia


Cardiac: denies: Chest pain / pressure


GI: denies: Abdominal Pain


Neurologic: reports: Head injury.  denies: Focal weakness, Numbness, Confused, 

Altered mental status, Headache, LOC





PD PAST MEDICAL HISTORY





- Past Medical History


Past Medical History: Yes


Cardiovascular: Hypertension


Respiratory: None


Neuro: None


Endocrine/Autoimmune: None


GI: GERD


GYN: None


: None


HEENT: None


Psych: None


Musculoskeletal: Osteoarthritis


Derm: None





- Past Surgical History


Past Surgical History: Yes


General: Colonoscopy


Ortho: Knee replacement, Arthroscopic surgery


/GYN: Hysterectomy


HEENT: Cataracts





- Present Medications


Home Medications: 


                                Ambulatory Orders











 Medication  Instructions  Recorded  Confirmed


 


Triamterene/Hydrochlorothiazid 0.5 tab PO DAILY 04/05/16 01/01/23





[Triamterene-Hctz 37.5-25 mg Cp]   


 


Glucosam/Chondr-Msm6/Manganese 1 tab PO DAILY 04/06/16 01/01/23





[Glucosamine-Chondroitin Sftgl]   


 


Losartan Potassium [Cozaar] 100 mg PO DAILY 01/01/23 01/01/23














- Allergies


Allergies/Adverse Reactions: 


                                    Allergies











Allergy/AdvReac Type Severity Reaction Status Date / Time


 


meperidine HCl * Allergy  Nausea Verified 01/01/23 08:41





[From Demerol]     


 


Sulfa (Sulfonamide Allergy  Unknown Verified 01/01/23 08:41





Antibiotics)     


 


Narcot AdvReac Mild Nausea Uncoded 05/13/21 14:29














- Social History


Does the pt smoke?: No


Smoking Status: Never smoker


Does the pt drink ETOH?: Yes


Does the pt have substance abuse?: No





- Immunizations


Immunizations are current?: Yes





PD ED PE NORMAL





- Vitals


Vital signs reviewed: Yes





- General


General: Alert and oriented X 3, No acute distress, Well developed/nourished, 

Other (The patient has obvious periorbital bruising both sides.  Minimal nasal 

tenderness.  Abrasion right forehead with some release gapping.  Upper lip 

abrasion/laceration without distortion of any teeth.)





- HEENT


HEENT: EOMI (Without pain or diplopia.)





- Derm


Derm: Normal color, Warm and dry





- Extremities


Extremities: Other (The left hand is tender through the dorsal and ulnar sides 

over the fourth and fifth metacarpals.  The wrist itself is not tender.  There 

is swelling and some early ecchymotic changes in the hand.  Normal color and 

capillary refill.)





- Neuro


Neuro: Alert and oriented X 3, No motor deficit, No sensory deficit, Normal 

speech





Results





- Vitals


Vitals: 


                               Vital Signs - 24 hr











  01/01/23





  08:41


 


Temperature 36.5 C


 


Heart Rate 70


 


Respiratory 16





Rate 


 


Blood Pressure 143/61 H


 


O2 Saturation 98








                                     Oxygen











O2 Source                      Room air

















- Rads (name of study)


  ** left hand


Radiology: Prelim report reviewed, EMP read indepedently, See rad report





PD Medical Decision Making





- ED course


Complexity details: reviewed results (view of xray independently by me as well 

shows no fractures. ), considered differential (Concern for fracture versus 

sprain or contusion of the hand.  We will get an x-ray to evaluate.  The patient

has facial abrasions and bruising without any signs of orbital injury nor 

concussive.), d/w patient





Departure





- Departure


Disposition: 01 Home, Self Care


Clinical Impression: 


 Fall, accidental, Hand sprain, Facial contusion, Abrasion of face





Condition: Stable


Record reviewed to determine appropriate education?: Yes


Instructions:  ED Sprain Hand


Comments: 


Your hand x-ray is good without any signs of fractures.  Obvious soft tissue 

swelling.  Activity as tolerated with your hand.  Rest ice and elevate it to 

help with some of the swelling.  You could use a wrap on there to.





Regarding your abrasions, continue with cleaning soap and water and applying 

ointment.  Recheck of infection.





The bruising around your eyes and face should resorb over a week or more and 

going from purple to green to yellow and slowly away.


Discharge Date/Time: 01/01/23 10:01

## 2023-01-01 NOTE — XRAY REPORT
PROCEDURE:  Hand 3 View LT

 

INDICATIONS:  fall onto left hand

 

TECHNIQUE:  3 views of the hand(s) acquired.  

 

COMPARISON:  None

 

FINDINGS:  

 

Bones:  No fractures or dislocations.  No suspicious bony lesions.  Joint space narrowing and small m
arginal osteophytes noted predominantly involving the distal interphalangeal joints. No marginal eros
ions present. First carpometacarpal and triscaphe joint space and narrowing with subchondral sclerosi
s present.

 

Soft tissues:  No suspicious soft tissue calcifications.  Dorsal soft tissue swelling without radiopa
que foreign body

 

IMPRESSION:  

Soft tissue swelling without fracture or foreign body

Moderate osteoarthritis

 

Reviewed by: Jose Colon MD on 1/1/2023 8:33 AM AK

Approved by: Jose Colon MD on 1/1/2023 8:33 AM AK

 

 

Station ID:  [id number]

## 2023-01-20 ENCOUNTER — HOSPITAL ENCOUNTER (OUTPATIENT)
Dept: HOSPITAL 76 - DI | Age: 83
Discharge: HOME | End: 2023-01-20
Attending: STUDENT IN AN ORGANIZED HEALTH CARE EDUCATION/TRAINING PROGRAM
Payer: MEDICARE

## 2023-01-20 DIAGNOSIS — N95.8: Primary | ICD-10-CM

## 2023-01-20 NOTE — DEXA REPORT
PROCEDURE: Dexa Spine and/or Hip

 

INDICATIONS: POST MENOPAUSAL

 

TECHNIQUE: Dual energy x-ray absorptiometry (DXA) was performed on a Ide[employer] System.  Regions measur
ed are the AP Spine, femoral neck, and if needed forearm.

 

COMPARISON: 2/16/2017

  

FINDINGS:

 

Lumbar Spine: 

Bone Mineral Density   1.72 g/cm/cm,T score  4.5, previously 4

 

Left Femoral Neck:

Bone Mineral Density  0.99 g/cm/cm, T score -0.4, previously 0

 

Left Hip:

Bone Mineral Density  0.97 g/cm/cm,T score -0.3, previously 0.2

 

(T score greater or equal to -1.0: NORMAL)

(T score from -1.1 to -2.4: OSTEOPENIA)

(T score less than or equal to -2.5 to: OSTEOPOROSIS)

 

Impression: 

Bone mineral density within normal limits.

 

Patients with diagnosis of osteoporosis or osteopenia should have regular bone mineral density assess
ment.  For those eligible for Medicare, routine testing is allowed once every 2 years.  Testing frequ
ency can be increased for patients who have rapidly progressing disease or for those who are receivin
g medical therapy to restore bone mass.

 

Reviewed by: Claude Stoddard MD on 1/20/2023 1:57 PM PST

Approved by: Claude Stoddard MD on 1/20/2023 1:57 PM PST

 

 

Station ID:  [id number]

## 2023-02-10 ENCOUNTER — HOSPITAL ENCOUNTER (EMERGENCY)
Dept: HOSPITAL 76 - ED | Age: 83
Discharge: HOME | End: 2023-02-10
Payer: COMMERCIAL

## 2023-02-10 VITALS — DIASTOLIC BLOOD PRESSURE: 54 MMHG | SYSTOLIC BLOOD PRESSURE: 153 MMHG
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
## 2023-02-10 DIAGNOSIS — S20.212A: Primary | ICD-10-CM
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
## 2023-02-10 DIAGNOSIS — I10: ICD-10-CM

## 2023-02-10 PROCEDURE — 71101 X-RAY EXAM UNILAT RIBS/CHEST: CPT

## 2023-02-10 PROCEDURE — 99283 EMERGENCY DEPT VISIT LOW MDM: CPT

## 2023-02-10 NOTE — ED PHYSICIAN DOCUMENTATION
History of Present Illness





- Stated complaint


Stated Complaint: FALL





- Chief complaint


Chief Complaint: Trauma Ch/Bk





- History obtained from


History obtained from: Patient





- History of Present Illness


Timing: Today


Pain level max: 5


Pain level now: 5





- Additonal information


Additional information: 


82-year-old female who presents to the emergency department stating that she was

walking her dog today when the dog yanked on the leash, she lost her balance and

fell striking her left ribs on a outside water faucet.  Worse with movement, 

better with rest. No head, neck, back pain.  No loss of consciousness.  Does not

take any blood thinners.





Review of Systems


Constitutional: denies: Fever, Chills


Throat: denies: Sore throat


Cardiac: denies: Chest pain / pressure


Respiratory: denies: Dyspnea, Cough, Wheezing


Skin: denies: Rash


Musculoskeletal: denies: Neck pain, Back pain


Neurologic: denies: Headache, Head injury, LOC





PD PAST MEDICAL HISTORY





- Past Medical History


Cardiovascular: Hypertension


Respiratory: None


Neuro: None


Endocrine/Autoimmune: None


GI: GERD


GYN: None


: None


HEENT: None


Psych: None


Musculoskeletal: Osteoarthritis


Derm: None





- Past Surgical History


Past Surgical History: Yes


General: Colonoscopy


Ortho: Knee replacement, Arthroscopic surgery


/GYN: Hysterectomy


HEENT: Cataracts





- Present Medications


Home Medications: 


                                Ambulatory Orders











 Medication  Instructions  Recorded  Confirmed


 


Triamterene/Hydrochlorothiazid 0.5 tab PO DAILY 04/05/16 01/01/23





[Triamterene-Hctz 37.5-25 mg Cp]   


 


Glucosam/Chondr-Msm6/Manganese 1 tab PO DAILY 04/06/16 01/01/23





[Glucosamine-Chondroitin Sftgl]   


 


Losartan Potassium [Cozaar] 100 mg PO DAILY 01/01/23 01/01/23


 


Lidocaine Patch 5% [Lidoderm Patch] 1 patch TOP DAILY PRN #10 patch 02/10/23 














- Allergies


Allergies/Adverse Reactions: 


                                    Allergies











Allergy/AdvReac Type Severity Reaction Status Date / Time


 


meperidine HCl * Allergy  Nausea Verified 02/10/23 11:29





[From Demerol]     


 


Sulfa (Sulfonamide Allergy  Unknown Verified 02/10/23 11:29





Antibiotics)     


 


Narcot AdvReac Mild Nausea Uncoded 02/10/23 11:29














- Social History


Does the pt smoke?: No


Smoking Status: Never smoker


Does the pt drink ETOH?: Yes


Does the pt have substance abuse?: No





- Immunizations


Immunizations are current?: Yes





PD ED PE NORMAL





- Vitals


Vital signs reviewed: Yes





- General


General: Alert and oriented X 3, No acute distress





- HEENT


HEENT: PERRL, Moist mucous membranes, Other (No scalp hematomas.  No palpable 

skull fractures.  There is a small amount of dried blood on the left ear, from a

fall yesterday.  No significant hematoma.  No laceration to repair.)





- Neck


Neck: Supple, no meningeal sign, No bony TTP





- Cardiac


Cardiac: RRR, Strong equal pulses





- Respiratory


Respiratory: No respiratory distress, Clear bilaterally





- Abdomen


Abdomen: Soft, Non tender, Non distended





- Back


Back: No CVA TTP, No spinal TTP, Other (No tenderness along the thoracic or 

lumbar spine.  No step-off or deformity.  There is tenderness along the left 

posterior ribs, approximately 9 through 12. No crepitus.  Small ecchymosis.)





- Derm


Derm: Warm and dry





- Neuro


Neuro: Alert and oriented X 3, CNs 2-12 intact, No motor deficit, No sensory 

deficit, Normal speech


Eye Opening: Spontaneous


Motor: Obeys Commands


Verbal: Oriented


GCS Score: 15





- Psych


Psych: Normal mood, Normal affect





Results





- Vitals


Vitals: 


                               Vital Signs - 24 hr











  02/10/23





  11:24


 


Temperature 36.0 C L


 


Heart Rate 54 L


 


Respiratory 18





Rate 


 


Blood Pressure 153/54 H


 


O2 Saturation 100








                                     Oxygen











O2 Source                      Room air

















- Rads (name of study)


  ** Left rib with chest x-ray


Radiology: Final report received, See rad report





PD Medical Decision Making





- ED course


Complexity details: reviewed results, re-evaluated patient, considered 

differential, d/w patient


ED course: 





A left-sided rib series was ordered.  There are no visible acute fractures.  

There are old healed fractures.  Patient would like to avoid any narcotic pain 

medication.  He states he will use Tylenol and Motrin at home.  We will also 

place her on a Lidoderm patch.  No pneumothorax or hemothorax.  Patient 

counseled regarding signs and symptoms for which I believe and urgent re-

evaluation would be necessary. Patient with good understanding of and agreement 

to plan and is comfortable going home at this time





This document was made in part using voice recognition software. While efforts 

are made to proofread this document, sound alike and grammatical errors may 

occur.





Departure





- Departure


Disposition: 01 Home, Self Care


Clinical Impression: 


Contusion of rib


Qualifiers:


 Encounter type: initial encounter Laterality: left Qualified Code(s): S20.212A 

- Contusion of left front wall of thorax, initial encounter





Condition: Good


Instructions:  ED Contusion Rib


Follow-Up: 


Justin Bernstein MD [Primary Care Provider] - Within 1 week


Prescriptions: 


Lidocaine Patch 5% [Lidoderm Patch] 1 patch TOP DAILY PRN #10 patch


 PRN Reason: pain


Comments: 


Your x-ray does not show any acute fractures today.  Please follow-up with your 

doctor for further care.  You can use the lidocaine patches for pain.  I would 

also recommend Motrin and/or Tylenol.





Your prescriptions were sent to [facility] in [place].


Discharge Date/Time: 02/10/23 12:35

## 2023-02-10 NOTE — XRAY REPORT
PROCEDURE:  Ribs w/PA Chest LT

 

INDICATIONS:  fall, rib pain

 

TECHNIQUE:  3 views of the left ribs were acquired, along with a single view chest.  

 

COMPARISON:  None

 

FINDINGS:  

 

Surgical changes and devices:  None.  

 

Bones and chest wall: Chronic-appearing deformity involving left anterior lateral upper to mid ribs a
re seen. No gross acute left rib fracture. No suspicious bony lesions.  Overlying soft tissues appear
 unremarkable.  

 

Lungs and pleura:  No pleural effusions or pneumothorax.  Lungs appear clear.  

 

Mediastinum:  Mediastinal contours appear normal.  Heart size is normal.  

 

IMPRESSION:  

Finding is suggestive of old healed left posterior lateral upper to mid rib fracture. No gross acute 
rib fracture. No acute cardiopulmonary pathology.

 

Reviewed by: Armando Rabago MD on 2/10/2023 12:21 PM PST

Approved by: Armando Rabago MD on 2/10/2023 12:21 PM PST

 

 

Station ID:  [id number]

## 2023-07-25 ENCOUNTER — HOSPITAL ENCOUNTER (OUTPATIENT)
Dept: HOSPITAL 76 - DI | Age: 83
Discharge: HOME | End: 2023-07-25
Attending: INTERNAL MEDICINE
Payer: MEDICARE

## 2023-07-25 DIAGNOSIS — I10: ICD-10-CM

## 2023-07-25 DIAGNOSIS — R94.31: ICD-10-CM

## 2023-07-25 DIAGNOSIS — R06.09: Primary | ICD-10-CM

## 2023-07-25 PROCEDURE — 93350 STRESS TTE ONLY: CPT
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
